# Patient Record
Sex: FEMALE | URBAN - METROPOLITAN AREA
[De-identification: names, ages, dates, MRNs, and addresses within clinical notes are randomized per-mention and may not be internally consistent; named-entity substitution may affect disease eponyms.]

---

## 2021-02-19 ENCOUNTER — OFFICE VISIT (OUTPATIENT)
Dept: URBAN - METROPOLITAN AREA CLINIC 23 | Facility: CLINIC | Age: 56
End: 2021-02-19
Payer: COMMERCIAL

## 2021-02-19 DIAGNOSIS — H35.89 OTHER SPECIFIED RETINAL DISORDERS: Primary | ICD-10-CM

## 2021-02-19 PROCEDURE — 92134 CPTRZ OPH DX IMG PST SGM RTA: CPT | Performed by: OPHTHALMOLOGY

## 2021-02-19 PROCEDURE — 99204 OFFICE O/P NEW MOD 45 MIN: CPT | Performed by: OPHTHALMOLOGY

## 2021-02-19 ASSESSMENT — INTRAOCULAR PRESSURE
OD: 13
OS: 13

## 2021-02-19 ASSESSMENT — KERATOMETRY
OS: 44.25
OD: 45.00

## 2021-02-19 NOTE — IMPRESSION/PLAN
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 Condition: stable. Vision: vision not affected. - no tanning agents
- no use of abnormal herbs or vitamins
- no history of intravenous drugs use Plan: Discussed diagnosis in detail with patient. Exam of right eye shows no significant white dots and left eye shows tiny white dots small tiny drusen creamy no change in fovea. Based on today's exam, diagnostic studies and review of records, this does not fit the classification for MEWDS, No treatment is required at this time. Will continue to observe condition and or symptoms. Recommend a follow up with retina in 2 years. OCT and Optos is normal no signs of outer retina changes OU